# Patient Record
Sex: FEMALE | Race: ASIAN | NOT HISPANIC OR LATINO
[De-identification: names, ages, dates, MRNs, and addresses within clinical notes are randomized per-mention and may not be internally consistent; named-entity substitution may affect disease eponyms.]

---

## 2020-03-12 ENCOUNTER — TRANSCRIPTION ENCOUNTER (OUTPATIENT)
Age: 45
End: 2020-03-12

## 2020-12-23 ENCOUNTER — APPOINTMENT (OUTPATIENT)
Dept: OBGYN | Facility: CLINIC | Age: 45
End: 2020-12-23
Payer: COMMERCIAL

## 2020-12-23 PROBLEM — Z00.00 ENCOUNTER FOR PREVENTIVE HEALTH EXAMINATION: Status: ACTIVE | Noted: 2020-12-23

## 2020-12-23 PROCEDURE — 99072 ADDL SUPL MATRL&STAF TM PHE: CPT

## 2020-12-23 PROCEDURE — 99243 OFF/OP CNSLTJ NEW/EST LOW 30: CPT

## 2021-05-04 ENCOUNTER — OUTPATIENT (OUTPATIENT)
Dept: OUTPATIENT SERVICES | Facility: HOSPITAL | Age: 46
LOS: 1 days | End: 2021-05-04
Payer: COMMERCIAL

## 2021-05-04 VITALS
HEART RATE: 82 BPM | DIASTOLIC BLOOD PRESSURE: 70 MMHG | TEMPERATURE: 99 F | SYSTOLIC BLOOD PRESSURE: 110 MMHG | RESPIRATION RATE: 18 BRPM | HEIGHT: 63 IN | WEIGHT: 166.01 LBS

## 2021-05-04 DIAGNOSIS — D25.9 LEIOMYOMA OF UTERUS, UNSPECIFIED: ICD-10-CM

## 2021-05-04 DIAGNOSIS — N85.6 INTRAUTERINE SYNECHIAE: ICD-10-CM

## 2021-05-04 DIAGNOSIS — Z98.890 OTHER SPECIFIED POSTPROCEDURAL STATES: Chronic | ICD-10-CM

## 2021-05-04 LAB
ANION GAP SERPL CALC-SCNC: 11 MMOL/L — SIGNIFICANT CHANGE UP (ref 7–14)
BLD GP AB SCN SERPL QL: NEGATIVE — SIGNIFICANT CHANGE UP
BUN SERPL-MCNC: 10 MG/DL — SIGNIFICANT CHANGE UP (ref 7–23)
CALCIUM SERPL-MCNC: 9.2 MG/DL — SIGNIFICANT CHANGE UP (ref 8.4–10.5)
CHLORIDE SERPL-SCNC: 102 MMOL/L — SIGNIFICANT CHANGE UP (ref 98–107)
CO2 SERPL-SCNC: 24 MMOL/L — SIGNIFICANT CHANGE UP (ref 22–31)
CREAT SERPL-MCNC: 0.84 MG/DL — SIGNIFICANT CHANGE UP (ref 0.5–1.3)
GLUCOSE SERPL-MCNC: 91 MG/DL — SIGNIFICANT CHANGE UP (ref 70–99)
HCG UR QL: NEGATIVE — SIGNIFICANT CHANGE UP
HCT VFR BLD CALC: 39.8 % — SIGNIFICANT CHANGE UP (ref 34.5–45)
HGB BLD-MCNC: 13.4 G/DL — SIGNIFICANT CHANGE UP (ref 11.5–15.5)
MCHC RBC-ENTMCNC: 29.2 PG — SIGNIFICANT CHANGE UP (ref 27–34)
MCHC RBC-ENTMCNC: 33.7 GM/DL — SIGNIFICANT CHANGE UP (ref 32–36)
MCV RBC AUTO: 86.7 FL — SIGNIFICANT CHANGE UP (ref 80–100)
NRBC # BLD: 0 /100 WBCS — SIGNIFICANT CHANGE UP
NRBC # FLD: 0 K/UL — SIGNIFICANT CHANGE UP
PLATELET # BLD AUTO: 306 K/UL — SIGNIFICANT CHANGE UP (ref 150–400)
POTASSIUM SERPL-MCNC: 4.3 MMOL/L — SIGNIFICANT CHANGE UP (ref 3.5–5.3)
POTASSIUM SERPL-SCNC: 4.3 MMOL/L — SIGNIFICANT CHANGE UP (ref 3.5–5.3)
RBC # BLD: 4.59 M/UL — SIGNIFICANT CHANGE UP (ref 3.8–5.2)
RBC # FLD: 12.6 % — SIGNIFICANT CHANGE UP (ref 10.3–14.5)
RH IG SCN BLD-IMP: POSITIVE — SIGNIFICANT CHANGE UP
SODIUM SERPL-SCNC: 137 MMOL/L — SIGNIFICANT CHANGE UP (ref 135–145)
WBC # BLD: 7.49 K/UL — SIGNIFICANT CHANGE UP (ref 3.8–10.5)
WBC # FLD AUTO: 7.49 K/UL — SIGNIFICANT CHANGE UP (ref 3.8–10.5)

## 2021-05-04 PROCEDURE — 93010 ELECTROCARDIOGRAM REPORT: CPT

## 2021-05-04 RX ORDER — SODIUM CHLORIDE 9 MG/ML
1000 INJECTION, SOLUTION INTRAVENOUS
Refills: 0 | Status: DISCONTINUED | OUTPATIENT
Start: 2021-05-12 | End: 2021-05-27

## 2021-05-04 NOTE — H&P PST ADULT - NSICDXPROBLEM_GEN_ALL_CORE_FT
PROBLEM DIAGNOSES  Problem: Uterine fibroid  Assessment and Plan: Robotic ASsisted Laparoscopic Myomectomy, Dilation Curettage.  pre-op instructfanny desir explained  CBC BMP UCG T&S EKG

## 2021-05-04 NOTE — H&P PST ADULT - PRO TOBACCO TYPE
.5 PPD x 15 years, now 2 cigarettes/day/cigarettes 0.5 PPD x 15 years, now 2 cigarettes/day/cigarettes

## 2021-05-04 NOTE — H&P PST ADULT - HISTORY OF PRESENT ILLNESS
46 y/o female with hx of infertility, s/p 2 rounds of IVF, frozen embryos to be implanted.  pt reports she has uterine fibroid, Dilation Curettage. 44 y/o female with hx of infertility, s/p 2 rounds of IVF, frozen embryos to be implanted at future date.  Pt reports she has uterine fibroid. Scheduled for Robotic Assisted Laparoscopic Myomectomy, Dilation Curettage.

## 2021-05-04 NOTE — H&P PST ADULT - NSANTHOSAYNRD_GEN_A_CORE
No. OMID screening performed.  STOP BANG Legend: 0-2 = LOW Risk; 3-4 = INTERMEDIATE Risk; 5-8 = HIGH Risk

## 2021-05-04 NOTE — H&P PST ADULT - ATTENDING COMMENTS
pt with recurrent fibroids and ?intrauterine adhesions for robotic ,myomectomy and hysteroscopy risks discussed.

## 2021-05-04 NOTE — H&P PST ADULT - NSICDXPASTSURGICALHX_GEN_ALL_CORE_FT
PAST SURGICAL HISTORY:  History of myomectomy 2010, 2018    S/P endometrial ablation x5, most recenlty 2018     PAST SURGICAL HISTORY:  History of in vitro fertilization x2  cycles    History of myomectomy 2010, 2018    S/P endometrial ablation x5, most recenlty 2018

## 2021-05-04 NOTE — H&P PST ADULT - PRIMARY CARE PROVIDER
[facility] [phone] [place] [phone]

## 2021-05-04 NOTE — H&P PST ADULT - GENITOURINARY COMMENTS
hx of infertility, s/p 2 rounds of IVF, frozen embryos to be implanted at future date.  Pt reports she has uterine fibroid

## 2021-05-04 NOTE — H&P PST ADULT - NSICDXPASTMEDICALHX_GEN_ALL_CORE_FT
PAST MEDICAL HISTORY:  COVID-19 march 2020. (mild)    Fibroid uterus     H/O infertility     H/O intestinal obstruction following surgery for abdominal myomectomy.  necessitated readmission after dischage home    Hashimoto's disease no present medicaiton     PAST MEDICAL HISTORY:  Chronic neck pain uses baclofen prn    COVID-19 march 2020. (mild)    Fibroid uterus     H/O infertility     H/O intestinal obstruction following surgery for abdominal myomectomy.  necessitated readmission after dischage home    Hashimoto's disease Pt reports she  doesnot presently have sympotoms.  WAs on synthroid, no no o= meds and TFT's remain WNL    Hyperlipidemia     Smoker

## 2021-05-04 NOTE — H&P PST ADULT - ASSESSMENT
46 y/o female with hx of infertility, s/p 2 rounds of IVF, frozen embryos to be implanted at future date.  Pt reports she has uterine fibroid, Dilation Curettage.

## 2021-05-05 PROBLEM — F17.200 NICOTINE DEPENDENCE, UNSPECIFIED, UNCOMPLICATED: Chronic | Status: ACTIVE | Noted: 2021-05-04

## 2021-05-05 PROBLEM — Z87.42 PERSONAL HISTORY OF OTHER DISEASES OF THE FEMALE GENITAL TRACT: Chronic | Status: ACTIVE | Noted: 2021-05-04

## 2021-05-05 PROBLEM — E78.5 HYPERLIPIDEMIA, UNSPECIFIED: Chronic | Status: ACTIVE | Noted: 2021-05-04

## 2021-05-05 PROBLEM — Z87.19 PERSONAL HISTORY OF OTHER DISEASES OF THE DIGESTIVE SYSTEM: Chronic | Status: ACTIVE | Noted: 2021-05-04

## 2021-05-05 PROBLEM — U07.1 COVID-19: Chronic | Status: ACTIVE | Noted: 2021-05-04

## 2021-05-05 PROBLEM — M54.2 CERVICALGIA: Chronic | Status: ACTIVE | Noted: 2021-05-04

## 2021-05-05 PROBLEM — D25.9 LEIOMYOMA OF UTERUS, UNSPECIFIED: Chronic | Status: ACTIVE | Noted: 2021-05-04

## 2021-05-05 PROBLEM — E06.3 AUTOIMMUNE THYROIDITIS: Chronic | Status: ACTIVE | Noted: 2021-05-04

## 2021-05-09 ENCOUNTER — APPOINTMENT (OUTPATIENT)
Dept: DISASTER EMERGENCY | Facility: CLINIC | Age: 46
End: 2021-05-09

## 2021-05-09 DIAGNOSIS — Z01.818 ENCOUNTER FOR OTHER PREPROCEDURAL EXAMINATION: ICD-10-CM

## 2021-05-10 LAB — SARS-COV-2 N GENE NPH QL NAA+PROBE: NOT DETECTED

## 2021-05-11 ENCOUNTER — TRANSCRIPTION ENCOUNTER (OUTPATIENT)
Age: 46
End: 2021-05-11

## 2021-05-11 NOTE — ASU PATIENT PROFILE, ADULT - PSH
History of in vitro fertilization  x2  cycles  History of myomectomy  2010, 2018  S/P endometrial ablation  x5, most recenlty 2018

## 2021-05-11 NOTE — ASU PATIENT PROFILE, ADULT - ANESTHESIA, PREVIOUS REACTION, PROFILE
Patient's daughter called stating that patient has been admitted to [facility]/ she would like  to go see her mother.  She is in room 411 none

## 2021-05-11 NOTE — ASU PATIENT PROFILE, ADULT - PMH
Chronic neck pain  uses baclofen prn  COVID-19  march 2020. (mild)  Fibroid uterus    H/O infertility    H/O intestinal obstruction  following surgery for abdominal myomectomy.  necessitated readmission after dischage home  Hashimoto's disease  Pt reports she  doesnot presently have sympotoms.  WAs on synthroid, no no o= meds and TFT's remain WNL  Hyperlipidemia    Smoker

## 2021-05-12 ENCOUNTER — OUTPATIENT (OUTPATIENT)
Dept: OUTPATIENT SERVICES | Facility: HOSPITAL | Age: 46
LOS: 1 days | Discharge: HOME | End: 2021-05-12
Payer: COMMERCIAL

## 2021-05-12 ENCOUNTER — RESULT REVIEW (OUTPATIENT)
Age: 46
End: 2021-05-12

## 2021-05-12 ENCOUNTER — APPOINTMENT (OUTPATIENT)
Dept: OBGYN | Facility: HOSPITAL | Age: 46
End: 2021-05-12

## 2021-05-12 VITALS
RESPIRATION RATE: 18 BRPM | DIASTOLIC BLOOD PRESSURE: 58 MMHG | WEIGHT: 166.01 LBS | SYSTOLIC BLOOD PRESSURE: 112 MMHG | HEIGHT: 63 IN | TEMPERATURE: 98 F | HEART RATE: 67 BPM | OXYGEN SATURATION: 100 %

## 2021-05-12 VITALS
OXYGEN SATURATION: 98 % | RESPIRATION RATE: 18 BRPM | SYSTOLIC BLOOD PRESSURE: 97 MMHG | HEART RATE: 65 BPM | DIASTOLIC BLOOD PRESSURE: 64 MMHG

## 2021-05-12 DIAGNOSIS — Z98.890 OTHER SPECIFIED POSTPROCEDURAL STATES: Chronic | ICD-10-CM

## 2021-05-12 DIAGNOSIS — N85.6 INTRAUTERINE SYNECHIAE: ICD-10-CM

## 2021-05-12 LAB
HCG UR QL: NEGATIVE — SIGNIFICANT CHANGE UP
RH IG SCN BLD-IMP: POSITIVE — SIGNIFICANT CHANGE UP

## 2021-05-12 PROCEDURE — 58561 HYSTEROSCOPY REMOVE MYOMA: CPT

## 2021-05-12 PROCEDURE — 88305 TISSUE EXAM BY PATHOLOGIST: CPT | Mod: 26

## 2021-05-12 RX ORDER — IBUPROFEN 200 MG
3 TABLET ORAL
Qty: 360 | Refills: 0
Start: 2021-05-12 | End: 2021-06-10

## 2021-05-12 RX ORDER — ONDANSETRON 8 MG/1
4 TABLET, FILM COATED ORAL ONCE
Refills: 0 | Status: DISCONTINUED | OUTPATIENT
Start: 2021-05-12 | End: 2021-05-27

## 2021-05-12 RX ORDER — FENTANYL CITRATE 50 UG/ML
25 INJECTION INTRAVENOUS
Refills: 0 | Status: DISCONTINUED | OUTPATIENT
Start: 2021-05-12 | End: 2021-05-12

## 2021-05-12 RX ORDER — BACLOFEN 100 %
0 POWDER (GRAM) MISCELLANEOUS
Qty: 0 | Refills: 0 | DISCHARGE

## 2021-05-12 RX ORDER — DIAZEPAM 5 MG
0 TABLET ORAL
Qty: 0 | Refills: 0 | DISCHARGE

## 2021-05-12 RX ORDER — ROSUVASTATIN CALCIUM 5 MG/1
1 TABLET ORAL
Qty: 0 | Refills: 0 | DISCHARGE

## 2021-05-12 RX ORDER — SODIUM CHLORIDE 9 MG/ML
1000 INJECTION, SOLUTION INTRAVENOUS
Refills: 0 | Status: DISCONTINUED | OUTPATIENT
Start: 2021-05-12 | End: 2021-05-27

## 2021-05-12 RX ORDER — DIPHENHYDRAMINE HCL 50 MG
0 CAPSULE ORAL
Qty: 0 | Refills: 0 | DISCHARGE

## 2021-05-12 RX ORDER — ZOLPIDEM TARTRATE 10 MG/1
0 TABLET ORAL
Qty: 0 | Refills: 0 | DISCHARGE

## 2021-05-12 RX ORDER — OXYCODONE HYDROCHLORIDE 5 MG/1
5 TABLET ORAL ONCE
Refills: 0 | Status: DISCONTINUED | OUTPATIENT
Start: 2021-05-12 | End: 2021-05-12

## 2021-05-12 RX ORDER — ACETAMINOPHEN 500 MG
3 TABLET ORAL
Qty: 360 | Refills: 0
Start: 2021-05-12 | End: 2021-06-10

## 2021-05-12 RX ORDER — WHEAT DEXTRIN 3 G/4 G
0 POWDER IN PACKET (EA) ORAL
Qty: 0 | Refills: 0 | DISCHARGE

## 2021-05-12 RX ORDER — ONDANSETRON 8 MG/1
1 TABLET, FILM COATED ORAL
Qty: 28 | Refills: 0
Start: 2021-05-12 | End: 2021-05-18

## 2021-05-12 RX ADMIN — SODIUM CHLORIDE 30 MILLILITER(S): 9 INJECTION, SOLUTION INTRAVENOUS at 14:54

## 2021-05-12 RX ADMIN — FENTANYL CITRATE 25 MICROGRAM(S): 50 INJECTION INTRAVENOUS at 21:01

## 2021-05-12 RX ADMIN — SODIUM CHLORIDE 125 MILLILITER(S): 9 INJECTION, SOLUTION INTRAVENOUS at 21:00

## 2021-05-12 NOTE — BRIEF OPERATIVE NOTE - NSICDXBRIEFPROCEDURE_GEN_ALL_CORE_FT
PROCEDURES:  Hysteroscopy with surgical removal of leiomyoma using resectoscope 12-May-2021 20:53:29  Tami Cain

## 2021-05-12 NOTE — ASU DISCHARGE PLAN (ADULT/PEDIATRIC) - ACTIVITY LEVEL
Nothing per vagina/No tub baths/No douching/No tampons/No intercourse No excercise/No heavy lifting/No sports/gym/No weight bearing/Nothing per vagina/No tub baths/No douching/No tampons/No intercourse

## 2021-05-12 NOTE — ASU DISCHARGE PLAN (ADULT/PEDIATRIC) - PROCEDURE
Operative hysteroscopy with resectoscope, submucosal fibroid resection, lysis of intrauterine synechiae

## 2021-05-12 NOTE — CHART NOTE - NSCHARTNOTEFT_GEN_A_CORE
R2 GYN Post Op Check Note    Patient seen and examined at bedside, recently post-op. No acute complaints at this time.  Patient voiding and ambulating. Denies CP, SOB, N/V, fevers, chills, or any other concerns.    Vital Signs Last 24 Hours  T(C): 36.6 (05-12-21 @ 20:35), Max: 36.9 (05-12-21 @ 14:14)  HR: 62 (05-12-21 @ 21:30) (61 - 67)  BP: 107/66 (05-12-21 @ 21:30) (102/63 - 112/58)  RR: 10 (05-12-21 @ 21:30) (10 - 18)  SpO2: 99% (05-12-21 @ 21:30) (97% - 100%)    I&O's Summary    12 May 2021 07:01  -  12 May 2021 22:10  --------------------------------------------------------  IN: 325 mL / OUT: 600 mL / NET: -275 mL        Physical Exam:  General: NAD  CV: RR  Lungs: breathing comfortably on RA  Abdomen: soft, non tender, non-distended  Pelvic: intrauterine perez in place, no bleeding  Ext: no pain or swelling    Labs:      MEDICATIONS  (STANDING):  lactated ringers. 1000 milliLiter(s) (30 mL/Hr) IV Continuous <Continuous>  lactated ringers. 1000 milliLiter(s) (125 mL/Hr) IV Continuous <Continuous>    MEDICATIONS  (PRN):  fentaNYL    Injectable 25 MICROGram(s) IV Push every 5 minutes PRN Moderate Pain (4 - 6)  ondansetron Injectable 4 milliGRAM(s) IV Push once PRN Nausea and/or Vomiting  oxyCODONE    IR 5 milliGRAM(s) Oral once PRN Moderate Pain (4 - 6)    A/P: 45y F s/p hysteroscopic myomectomy.  Patient recovering well in post operative state.  -clear for discharge form GYN perspective  -discharge per PACU protocol  -all questions answered    Reza Caballero, PGY2

## 2021-05-12 NOTE — BRIEF OPERATIVE NOTE - COMMENTS
*Pt initially scheduled for robotic assisted laparoscopic myomectomy however hysteroscopic findings notable for intracavitary fibroid and decision to proceed with hysteroscopic resection**  Fluid deficit 650cc glycine   Ultrasound guidance performed   Intrauterine 3cc balloon placed   Dictation #[id number]

## 2021-05-12 NOTE — BRIEF OPERATIVE NOTE - OPERATION/FINDINGS
EUA  - small AV uterus, mobile  - no adnexal fullness  - normal appearing cervix, vaginal mucosa  - well healed Pfannenstiel skin incision  Hysteroscopic  - normal appearing endocervical canal  - left tubal ostia visualized  - right tubal ostia not visualized  - type 2 submucosal fibroid arising from right lateral fundal wall with overlying intrauterine synechiae  - type 1 submucosal fibroid arising from anterior lower uterine segment

## 2021-05-12 NOTE — ASU DISCHARGE PLAN (ADULT/PEDIATRIC) - ASU DC SPECIAL INSTRUCTIONSFT
Discharge Instructions:  1. Diet: advance as tolerated  2. Activity limited by:   - no running, heavy lifting, strenuous exercise,   - nothing in vagina (bath, swim, intercourse, douching, tampons) for 2 weeks  3. Medications:   - Senna to prevent constipation (please hold for loose stools)  - Ibuprofen 600mg every 6 hours as needed for pain  - Acetaminophen 500mg every 4 hours as needed for pain  4. Follow-up appointment - 2 weeks. Please call the office upon discharge to schedule your appointment if you do not have one already.   5. Precautions:  - Call the office or go to the ED if you have any of the followin) Fever >100.4 that does not resolve  2) Intractable pain  3) Heavy bleeding  6. Intrauterine balloon management and prevention of intra-uterine scar formation:   - Day #0 = day of surgery  - Day #1-9: Take Doxycyline 100mg every 12 hours (antibiotic)   - Day #1-17: Take Estradiol 2mg oral daily   - Day #8: Present to office for intra-uterine balloon removal. Call the office to make an appointment

## 2021-05-12 NOTE — BRIEF OPERATIVE NOTE - NSICDXBRIEFPOSTOP_GEN_ALL_CORE_FT
POST-OP DIAGNOSIS:  Intrauterine synechiae 12-May-2021 20:54:48  Tami Cain  Fibroids, submucosal 12-May-2021 20:55:10  Tami Cain

## 2021-05-12 NOTE — BRIEF OPERATIVE NOTE - NSICDXBRIEFPREOP_GEN_ALL_CORE_FT
PRE-OP DIAGNOSIS:  Synechia, intrauterine 12-May-2021 20:54:29  Tami Cain  Fibroid uterus 12-May-2021 20:54:36  Taim Cain

## 2021-05-12 NOTE — ASU DISCHARGE PLAN (ADULT/PEDIATRIC) - CARE PROVIDER_API CALL
Dm Sauceda)  Obstetrics and Gynecology  [address] [place] [address], [address]  [place] [address]  Phone: [phone]  Fax: [phone]  Follow Up Time:

## 2021-05-13 RX ORDER — OXYCODONE HYDROCHLORIDE 5 MG/1
1 TABLET ORAL
Qty: 5 | Refills: 0
Start: 2021-05-13

## 2021-05-20 ENCOUNTER — APPOINTMENT (OUTPATIENT)
Dept: OBGYN | Facility: CLINIC | Age: 46
End: 2021-05-20
Payer: COMMERCIAL

## 2021-05-20 LAB — SURGICAL PATHOLOGY STUDY: SIGNIFICANT CHANGE UP

## 2021-05-20 PROCEDURE — 99024 POSTOP FOLLOW-UP VISIT: CPT

## 2021-05-27 ENCOUNTER — APPOINTMENT (OUTPATIENT)
Dept: OBGYN | Facility: CLINIC | Age: 46
End: 2021-05-27

## 2024-07-31 ENCOUNTER — APPOINTMENT (OUTPATIENT)
Dept: ORTHOPEDIC SURGERY | Facility: CLINIC | Age: 49
End: 2024-07-31